# Patient Record
Sex: MALE | Race: WHITE
[De-identification: names, ages, dates, MRNs, and addresses within clinical notes are randomized per-mention and may not be internally consistent; named-entity substitution may affect disease eponyms.]

---

## 2017-06-04 ENCOUNTER — HOSPITAL ENCOUNTER (EMERGENCY)
Dept: HOSPITAL 7 - FB.ED | Age: 47
Discharge: HOME | End: 2017-06-04
Payer: COMMERCIAL

## 2017-06-04 VITALS — DIASTOLIC BLOOD PRESSURE: 92 MMHG | SYSTOLIC BLOOD PRESSURE: 136 MMHG

## 2017-06-04 DIAGNOSIS — S49.91XA: Primary | ICD-10-CM
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
## 2017-06-04 NOTE — EDM.PDOC
ED HPI GENERAL MEDICAL PROBLEM





- General


Chief Complaint: Upper Extremity Injury/Pain


Stated Complaint: RT SHOULDER INJURY


Time Seen by Provider: 06/04/17 13:50


Source of Information: Reports: Patient


History Limitations: Reports: No Limitations





- History of Present Illness


INITIAL COMMENTS - FREE TEXT/NARRATIVE: 





Today Frederick was exiting a bobcat when he slipped in the bucket and landed onto 

R shoulder. There was no noise. He experienced immediate pain and stiffness 

with any attempts at movement. There is no loss of sensation in the RUE. He 

experienced no neck pain or loss of movement. He has taken no meds. 


Treatments PTA: Reports: Other (see below)


Other Treatments PTA: sling


  ** Right Shoulder


Pain Score (Numeric/FACES): 8





- Related Data


 Allergies











Allergy/AdvReac Type Severity Reaction Status Date / Time


 


No Known Allergies Allergy   Verified 06/04/17 13:46











Home Meds: 


 Home Meds





NK [No Known Home Meds]  06/04/17 [History]











Review of Systems





- Review of Systems


Review Of Systems: See Below


Constitutional: Reports: No Symptoms


Eyes: Reports: No Symptoms


Ears: Reports: No Symptoms


Nose: Reports: No Symptoms


Mouth/Throat: Reports: No Symptoms


Respiratory: Reports: No Symptoms


Cardiovascular: Reports: No Symptoms


GI/Abdominal: Reports: No Symptoms


Genitourinary: Reports: No Symptoms


Musculoskeletal: Reports: Shoulder Pain (right)


Skin: Reports: No Symptoms


Neurological: Reports: No Symptoms


Psychiatric: Reports: No Symptoms





ED EXAM, GENERAL





- Physical Exam


Exam: See Below


Exam Limited By: No Limitations


General Appearance: Alert, WD/WN, Moderate Distress


Eye Exam: Bilateral Eye: Normal Inspection, PERRL


Head: Atraumatic


Neck: Normal Inspection, Supple, Non-Tender, Full Range of Motion


Respiratory/Chest: Lungs Clear, Chest Non-Tender


Cardiovascular: Regular Rate, Rhythm


Back Exam: Normal Inspection


Extremities: Normal Inspection, Limited Range of Motion (right shoulder)


Neurological: Alert, Oriented, CN II-XII Intact, Normal Cognition, No Motor/

Sensory Deficits


Psychiatric: Normal Affect, Normal Mood


Skin Exam: Warm, Dry


Lymphatic: No Adenopathy





Course





- Vital Signs


Last Recorded V/S: 


 Last Vital Signs











Temp  36.3 C   06/04/17 13:46


 


Pulse  92   06/04/17 13:46


 


Resp  14   06/04/17 13:46


 


BP  141/100 H  06/04/17 13:46


 


Pulse Ox  95   06/04/17 13:46














- Orders/Labs/Meds


Orders: 


 Active Orders 24 hr











 Category Date Time Status


 


 Shoulder Comp Rt [CR] Stat Exams  06/04/17 13:56 Taken














Departure





- Departure


Time of Disposition: 14:55


Disposition: Home, Self-Care 01


Condition: fair


Clinical Impression: 


Right shoulder injury


Qualifiers:


 Encounter type: initial encounter Qualified Code(s): S49.91XA - Unspecified 

injury of right shoulder and upper arm, initial encounter








- Discharge Information


Forms:  ED Department Discharge





- Problem List & Annotations


(1) Right shoulder injury


SNOMED Code(s): 063739565


   Code(s): S49.91XA - UNSP INJURY OF RIGHT SHOULDER AND UPPER ARM, INIT ENCNTR

   Status: Acute   Current Visit: Yes   Annotation/Comment:: Ice packs, sling 

for comfort, Ibuprofen for pain, and follow up with PCP within the next 48 hrs. 

He is not released for work.   


Qualifiers: 


   Encounter type: initial encounter   Qualified Code(s): S49.91XA - 

Unspecified injury of right shoulder and upper arm, initial encounter   





- Problem List Review


Problem List Initiated/Reviewed/Updated: Yes





- My Orders


Last 24 Hours: 


My Active Orders





06/04/17 13:56


Shoulder Comp Rt [CR] Stat 














- Assessment/Plan


Last 24 Hours: 


My Active Orders





06/04/17 13:56


Shoulder Comp Rt [CR] Stat 











Plan: 





Follow up with PCP. No driving permitted until cleared by physician.

## 2018-05-30 ENCOUNTER — HOSPITAL ENCOUNTER (EMERGENCY)
Dept: HOSPITAL 7 - FB.ED | Age: 48
Discharge: HOME | End: 2018-05-30
Payer: COMMERCIAL

## 2018-05-30 DIAGNOSIS — S61.412A: Primary | ICD-10-CM
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
## 2018-05-30 PROCEDURE — 12042 INTMD RPR N-HF/GENIT2.6-7.5: CPT

## 2018-05-30 PROCEDURE — 96372 THER/PROPH/DIAG INJ SC/IM: CPT

## 2018-05-30 PROCEDURE — 99282 EMERGENCY DEPT VISIT SF MDM: CPT

## 2018-05-30 NOTE — EDM.PDOC
ED HPI GENERAL MEDICAL PROBLEM





- General


Chief Complaint: Laceration


Stated Complaint: HAND LAC


Time Seen by Provider: 05/30/18 19:45


Source of Information: Reports: Patient, Family


History Limitations: Reports: No Limitations





- History of Present Illness


INITIAL COMMENTS - FREE TEXT/NARRATIVE: 





47 y.o.w.m came to the ed with his wife  after he injured hid left hand an a 

table saw at his own home. Pt was initially bleeding at his left lat had. was 

able to move his finges, mage a fist spread mhis finges, felt numbness at his 

left 5th finger, however. His CAP refil was <2 sec. No N/V/D or any other acute 

medical issues. /105 pulse 90 RR 18 Pulse ox 98% Temp 36.6


Onset Date: 05/30/18


Onset Time: 18:45


Duration: Hour(s):


Location: Reports: Upper Extremity, Left (hand)


Quality: Reports: Ache, Burning, Dull, Throbbing


Severity: Moderate


Improves with: Reports: Medication, Rest


Worsens with: Reports: Movement


Context: Reports: Trauma





- Related Data


 Allergies











Allergy/AdvReac Type Severity Reaction Status Date / Time


 


No Known Allergies Allergy   Verified 05/30/18 20:25











Home Meds: 


 Home Meds





Cephalexin [Keflex] 500 mg PO Q6H #40 cap 05/30/18 [Rx]











Past Medical History





- Infectious Disease History


Infectious Disease History: Reports: Chicken Pox





- Past Surgical History


Musculoskeletal Surgical History: Reports: Other (See Below)


Other Musculoskeletal Surgeries/Procedures:: lt rotator cuff surgury 2 years ago





Social & Family History





- Caffeine Use


Caffeine Use: Reports: Tea





ED ROS GENERAL





- Review of Systems


Review Of Systems: See Below


Constitutional: Reports: No Symptoms


HEENT: Reports: No Symptoms


Respiratory: Reports: No Symptoms


Cardiovascular: Reports: No Symptoms


Endocrine: Reports: No Symptoms


GI/Abdominal: Reports: No Symptoms


: Reports: No Symptoms


Musculoskeletal: Reports: No Symptoms


Skin: Reports: Wound


Neurological: Reports: Paresthesia (left 5th finger)


Psychiatric: Reports: No Symptoms


Hematologic/Lymphatic: Reports: No Symptoms


Immunologic: Reports: No Symptoms





ED EXAM, SKIN/RASH


Exam: See Below


Exam Limited By: No Limitations


General Appearance: Alert, WD/WN, Moderate Distress


Eye Exam: Bilateral Eye: Normal Inspection


Ears: Normal External Exam, Normal Canal


Nose: Normal Inspection, Normal Mucosa, No Blood


Throat/Mouth: Normal Inspection, Normal Lips, Normal Teeth, Normal Gums


Head: Atraumatic, Normocephalic


Neck: Normal Inspection, Supple, Non-Tender, Full Range of Motion


Respiratory/Chest: No Respiratory Distress, Lungs Clear, Normal Breath Sounds


Cardiovascular: Normal Peripheral Pulses, Regular Rate, Rhythm, No Edema, No 

Gallop, No JVD, No Murmur


GI/Abdominal: Normal Bowel Sounds, Soft, Non-Tender, No Organomegaly, No 

Distention, No Abnormal Bruit, No Mass


 (Male) Exam: Deferred


Rectal (Males) Exam: Deferred


Back Exam: Normal Inspection, Full Range of Motion


Extremities: Limited Range of Motion


Neurological: Alert, Oriented, CN II-XII Intact, Normal Cognition, Normal Gait, 

Normal Reflexes, No Motor/Sensory Deficits


Psychiatric: Normal Affect, Normal Mood


Skin: Warm, Dry, Normal Color, Wound/Incision (complex Laceration left hand lat 

aspect )


Location, Skin: Upper Extremity, Left (hand)


Lymphatic: No Adenopathy





ED SKIN PROCEDURES





- Laceration/Wound Repair


  ** Left Lateral Hand


Lac/Wound length In cm: 7.5


Appearance: Subcutaneous, Muscle, Stellate, Irregular, Mildly Contaminated


Distal NVT: Neuro & Vascular Intact


Anesthetic Type: Local


Local Anesthesia - Bupivicaine (Marcaine): 0.5% Plain


Local Anesthetic Volume: Other (15)


Skin Prep: Providone-Iodine (Betadine)


Saline Irrigation (cc's): 20


Exploration/Debridement/Repair: Wound Explored, In a Bloodless Field, Explored 

to Base


Suture Size: 3-0


# of Sutures: 17


Suture Type: Interrupted


# of Sutures: 5


Repaired with: Vicryl


Tetanus Status Addressed: Yes (UTD 3 years ago)


Complications: No





Course





- Vital Signs


Text/Narrative:: 





47 y.o.w.m came to the ed with his wife  after he injured hid left hand an a 

table saw at his own home. Pt was initially bleeding at his left lat had. was 

able to move his finges, mage a fist spread mhis finges, felt numbness at his 

left 5th finger, however. His CAP refil was <2 sec. No N/V/D or any other acute 

medical issues. /105 pulse 90 RR 18 Pulse ox 98% Temp 36.6


PE: 47 y.o.w.m with a left hand injuries, mild contaminated. parestesia left 

little finger


Procedure: Please see above


Impression: Laceration with table saw left hand. 


Tx: Wound repair, ABX, Percocet


Reexam: improved


Plan: D/C with instructions


Last Recorded V/S: 


 Last Vital Signs











Temp  36.6 C   05/30/18 20:00


 


Pulse      


 


Resp  16   05/30/18 22:00


 


BP  148/88 H  05/30/18 22:00


 


Pulse Ox  99   05/30/18 22:00














- Orders/Labs/Meds


Meds: 


Medications














Discontinued Medications














Generic Name Dose Route Start Last Admin





  Trade Name Josh  PRN Reason Stop Dose Admin


 


Ceftriaxone Sodium  1,000 mg  05/30/18 21:51  05/30/18 22:00





  Rocephin  IM  05/30/18 21:52  1,000 mg





  ONETIME ONE   Administration





     





     





     





     


 


Oxycodone/Acetaminophen  1 tab  05/30/18 20:25  05/30/18 20:29





  Percocet 325-5 Mg  PO  05/30/18 20:26  1 tab





  ONETIME ONE   Administration





     





     





     





     














Departure





- Departure


Time of Disposition: 22:00


Disposition: Home, Self-Care 01


Condition: Good


Clinical Impression: 


Laceration of hand


Qualifiers:


 Encounter type: initial encounter Foreign body presence: without foreign body 

Laterality: left Qualified Code(s): S61.412A - Laceration without foreign body 

of left hand, initial encounter








- Discharge Information


Prescriptions: 


Cephalexin [Keflex] 500 mg PO Q6H #40 cap


Referrals: 


Donovan Oropeza MD [Primary Care Provider] - 


Forms:  ED Department Discharge, ED Return to Work/School Form


Additional Instructions: 


Please keep wound dry and clean, please apply neosporine ointment to wound 

twice daily, please take percocet for severe pain only. please take keflex as 

recommended, please f/u in 2-3 days for wound check, suture removal in 10-14 

days. Please come back if your symptoms get worse acutely.

## 2018-05-31 VITALS — SYSTOLIC BLOOD PRESSURE: 148 MMHG | DIASTOLIC BLOOD PRESSURE: 88 MMHG
